# Patient Record
Sex: FEMALE | Race: OTHER
[De-identification: names, ages, dates, MRNs, and addresses within clinical notes are randomized per-mention and may not be internally consistent; named-entity substitution may affect disease eponyms.]

---

## 2018-08-30 ENCOUNTER — HOSPITAL ENCOUNTER (EMERGENCY)
Dept: HOSPITAL 72 - EMR | Age: 53
Discharge: HOME | End: 2018-08-30
Payer: COMMERCIAL

## 2018-08-30 VITALS — BODY MASS INDEX: 25.21 KG/M2 | WEIGHT: 137 LBS | HEIGHT: 62 IN

## 2018-08-30 VITALS — SYSTOLIC BLOOD PRESSURE: 142 MMHG | DIASTOLIC BLOOD PRESSURE: 82 MMHG
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
## 2018-08-30 DIAGNOSIS — I10: ICD-10-CM

## 2018-08-30 DIAGNOSIS — S09.90XA: Primary | ICD-10-CM
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
## 2018-08-30 DIAGNOSIS — S16.1XXA: ICD-10-CM

## 2018-08-30 PROCEDURE — 99283 EMERGENCY DEPT VISIT LOW MDM: CPT
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
